# Patient Record
Sex: FEMALE | Race: WHITE | NOT HISPANIC OR LATINO | Employment: FULL TIME | URBAN - METROPOLITAN AREA
[De-identification: names, ages, dates, MRNs, and addresses within clinical notes are randomized per-mention and may not be internally consistent; named-entity substitution may affect disease eponyms.]

---

## 2017-12-08 ENCOUNTER — ALLSCRIPTS OFFICE VISIT (OUTPATIENT)
Dept: OTHER | Facility: OTHER | Age: 47
End: 2017-12-08

## 2017-12-08 DIAGNOSIS — R07.9 CHEST PAIN: ICD-10-CM

## 2017-12-08 DIAGNOSIS — R00.2 PALPITATIONS: ICD-10-CM

## 2017-12-12 NOTE — CONSULTS
Assessment  1  Palpitations (785 1) (R00 2)   2  Chest pain (786 50) (R07 9)    Plan  Chest pain    · (1) CBC/PLT/DIFF; Status:Active; Requested for:69Syv2421;    Perform:[facility]; Due:99Dlu8802; Ordered; For:Chest pain; Ordered By:Rodolfo Parmar;   · (1) COMPREHENSIVE METABOLIC PANEL; Status:Active; Requested for:07Xma2735;    Perform:[facility]; Due:58Vln6723; Ordered;pain; Ordered By:Rodolfo Parmar;   · (1) LIPID PANEL FASTING W DIRECT LDL REFLEX; Status:Active; Requestedfor:08Dec2017;    Perform:[facility]; Due:44Isu9128; Ordered; For:Chest pain; Ordered By:Rodolfo Parmar;   · (1) LIPOPROTEIN (a); Status:Active; Requested for:08Dec2017;    Perform:[facility]; Due:08Dec2018; Ordered; For:Chest pain; Ordered By:Rodolfo Parmar;   · (1) MAGNESIUM; Status:Active; Requested for:23Nkm1384;    Perform:[facility]; Due:12Ctq5748; Ordered; For:Chest pain; Ordered By:Rodolfo Parmar;   · (1) VITAMIN D 25-HYDROXY; Status:Active; Requested for:08Dec2017;    Perform:[facility]; Due:62Xzc3987; Ordered; For:Chest pain; Ordered By:Rodolfo Parmar;   · EKG/ECG- POC; Status:Complete;   Done: 82ENF1628   Perform: In Office; [phone]; Last Updated By:Aparna Decker; 12/8/2017 2:05:35 PM;Ordered; For:Chest pain; Ordered By:Rodolfo Parmar;   · STRESS TEST ONLY, EXERCISE; Status:Active; Requested for:08Dec2017;    Perform:[facility]; [phone]; Last Updated By:Frantz Elizabeth; 12/8/2017 3:08:19 PM;Ordered; For:Chest pain; Ordered By:Rodolfo Parmar;  Chest pain, Palpitations    · (1) TSH; Status:Active; Requested for:68Hgh7031;    Perform:[facility]; Due:98Ksn3832; Ordered;pain, Palpitations; Ordered By:Rodolfo Parmar;    Discussion/Summary    Palpitations- paroxysmal svt? previous 24 hour holter reported negative but patient states typical symptoms not [id number] holter monitor  pain- intermediate Larose risk factors  possible msk/anxiety related  discussed possible etiologyexercise treadmill        Chief Fay Song is here today for a second opinion  She brought recent echo with her  She states that she has chest pain, palpitations, and headaches  She hasn't felt like herself and the other Dr  she had seen diagnosed her with anxiety  She is concerned due to family history  JW      History of Present Illness  Cardiology HPI Free Text Note Form [facility]: Appreciate consultation 2nd opinionyo woman with no prior cardiac hx with periodic palpitations /chest tightness which is increasing in frequency  She has noticed sporadically not related with time  Has the symptoms more frequently and can happen daily  When she wore the heart monitor did not have the same symptoms  Last year felt a flutter but this year feels a tightness  She has been healthy and with no recent sickness  No weight change  No major life event- denies having significant syptoms  eye orbitmonitor and the echo cardiodrogramnot checked thyroid in a couple of years        [facility] Hospitalschool Counselorfor walk at [facility] and do eliptical- no change in performacesmoking hxglasses of wine a daycaffeine usage- occasional teaenergy drinksno daytime fatigue, sleepiness  Hxhx of valve replacementwith mitral valve prolapsewith triple bypass      Review of Systems     Cardiac: palpitations present , but-- No complaints of chest pain, no palpitations, no fainting  Skin: No complaints of nonhealing sores or skin rash  Genitourinary: No complaints of recurrent urinary tract infections, frequent urination at night, difficult urination, blood in urine, kidney stones, loss of bladder control, kidney problems, denies any birth control or hormone replacement, is not post menopausal, not currently pregnant  Psychological: No complaints of feeling depressed, anxiety, panic attacks, or difficulty concentrating  General: No complaints of trouble sleeping, lack of energy, fatigue, appetite changes, weight changes, fever, frequent infections, or night sweats    Respiratory: No complaints of shortness of breath, cough with sputum, or wheezing  HEENT: No complaints of serious problems, hearing problems, nose problems, throat problems, or snoring  Gastrointestinal: No complaints of liver problems, nausea, vomiting, heartburn, constipation, bloody stools, diarrhea, problems swallowing, adbominal pain, or rectal bleeding  Hematologic: No complaints of bleeding disorders, anemia, blood clots, or excessive brusing  Neurological: No complaints of numbness, tingling, dizziness, weakness, seizures, headaches, syncope or fainting, AM fatigue, daytime sleepiness, no witnessed apnea episodes  Musculoskeletal: No complaints of arthritis, back pain, or painfull swelling  ROS reviewed  Active Problems  1  Chest pain (786 50) (R07 9)   2  Left elbow tendinitis (727 09) (M77 8)   3  Tendinitis of left rotator cuff (726 10) (M75 82)   4  Ulcer (707 9)    Past Medical History   · History of hepatitis (V12 09) (Z86 19)   · History of hypertension (V12 59) (Z86 79)   · History of ulceration (V13 89) (X65 138)    The active problems and past medical history were reviewed and updated today  Surgical History   · History of Eye Surgery    The surgical history was reviewed and updated today  Family History  Maternal Grandmother    · Family history of Colon cancer (153 9) (C18 9)   · Family history of Lung cancer (162 9) (C34 90)  Family History    · Family history of arthritis (V17 7) (Z82 61)   · Family history of coronary artery disease (V17 3) (Z82 49)   · Family history of diabetes mellitus (V18 0) (Z83 3)   · Family history of hypertension (V17 49) (Z82 49)   · Family history of malignant neoplasm (V16 9) (Z80 9)   · Family history of High cholesterol  Family History Reviewed: The family history was reviewed and updated today         Social History     · Daily alcohol use   ·    · Never a smoker   · Occasional alcohol use   · Pets/Animals: Rabbit   · Sleeps 6 -7 hours a day  The social history was reviewed and updated today  Current Meds   1  B Complex Oral Tablet; TAKE 1 TABLET DAILY; Therapy: (Recorded:88Mtg5798) to Recorded   2  Echinacea 400 MG Oral Capsule; take 1 capsule daily; Therapy: (Recorded:74Pne7672) to Recorded   3  Flax Seed Oil 1000 MG Oral Capsule; TAKE AS DIRECTED; Therapy: (Recorded:75Znp6474) to Recorded   4  Iron 325 (65 Fe) MG Oral Tablet; TAKE 1 TABLET DAILY AS DIRECTED; Therapy: (Recorded:75Jvt3074) to Recorded   5  Vitamin D3 1000 UNIT Oral Tablet; TAKE 1 TABLET DAILY; Therapy: (Recorded:57Lhn0815) to Recorded   6  Vitamin E 400 UNIT Oral Tablet; TAKE 1 TABLET DAILY; Therapy: (Recorded:52Klo2381) to Recorded    The medication list was reviewed and updated today  Allergies  1  No Known Drug Allergies    Vitals  Signs     Heart Rate: 98, Apical  Systolic: 750, RUE, Sitting  Diastolic: 70, RUE, Sitting  Height: 5 ft 6 in  Weight: 158 lb 6 4 oz  BMI Calculated: 25 57  BSA Calculated: 1 81  O2 Saturation: 97, RA    Physical Exam   Constitutional  General appearance: No acute distress, well appearing and well nourished  Eyes  Conjunctiva and Sclera examination: Conjunctiva pink, sclera anicteric  Ears, Nose, Mouth, and Throat - Oropharynx: Clear, nares are clear, mucous membranes are moist   Neck  Neck and thyroid: Normal, supple, trachea midline, no thyromegaly  Pulmonary  Respiratory effort: No increased work of breathing or signs of respiratory distress  Auscultation of lungs: Clear to auscultation, no rales, no rhonchi, no wheezing, good air movement  Cardiovascular  Auscultation of heart: Normal rate and rhythm, normal S1 and S2, no murmurs  Carotid pulses: Normal, 2+ bilaterally  Peripheral vascular exam: Normal pulses throughout, no tenderness, erythema or swelling  Pedal pulses: Normal, 2+ bilaterally  Examination of extremities for edema and/or varicosities: Normal    Abdomen  Abdomen: Non-tender and no distention     Liver and spleen: No hepatomegaly or splenomegaly  Musculoskeletal Gait and station: Normal gait  -- Digits and nails: Normal without clubbing or cyanosis  -- Inspection/palpation of joints, bones, and muscles: Normal, ROM normal    Skin - Skin and subcutaneous tissue: Normal without rashes or lesions  Skin is warm and well perfused, normal turgor  Neurologic - Cranial nerves: II - XII intact  -- Speech: Normal    Psychiatric - Orientation to person, place, and time: Normal -- Mood and affect: Normal       Results/Data  I personally reviewed the recording/images in the office today  My interpretation follows  Echocardiogram/ZARA: Normal EF, mild LVH  normal sinus rhythm no acute st/t wave changes [id number]      Future Appointments    Date/Time Provider Specialty Site   01/15/2018 08:20 AM CYRUS Parekh  Cardiology [facility] CARDIOLOGY [facility]     End of Encounter Meds    1  B Complex Oral Tablet; TAKE 1 TABLET DAILY; Therapy: (Recorded:52Awj7197) to Recorded   2  Echinacea 400 MG Oral Capsule; take 1 capsule daily; Therapy: (Recorded:10Okt7066) to Recorded   3  Flax Seed Oil 1000 MG Oral Capsule; TAKE AS DIRECTED; Therapy: (Recorded:96Frl1631) to Recorded   4  Iron 325 (65 Fe) MG Oral Tablet; TAKE 1 TABLET DAILY AS DIRECTED; Therapy: (Recorded:34Lty6191) to Recorded   5  Vitamin D3 1000 UNIT Oral Tablet; TAKE 1 TABLET DAILY; Therapy: (Recorded:02Osh0327) to Recorded   6  Vitamin E 400 UNIT Oral Tablet; TAKE 1 TABLET DAILY;  Therapy: (Recorded:99Euw0638) to Recorded    Signatures   Electronically signed by : CYRUS Godinez ; Dec 12 2017 12:22AM EST                       (Author)

## 2017-12-19 ENCOUNTER — HOSPITAL ENCOUNTER (OUTPATIENT)
Dept: NON INVASIVE DIAGNOSTICS | Facility: HOSPITAL | Age: 47
Discharge: HOME | End: 2017-12-19
Attending: INTERNAL MEDICINE
Payer: COMMERCIAL

## 2017-12-19 ENCOUNTER — GENERIC CONVERSION - ENCOUNTER (OUTPATIENT)
Dept: CARDIOLOGY CLINIC | Facility: CLINIC | Age: 47
End: 2017-12-19

## 2017-12-19 ENCOUNTER — GENERIC CONVERSION - ENCOUNTER (OUTPATIENT)
Dept: OTHER | Facility: OTHER | Age: 47
End: 2017-12-19

## 2017-12-19 DIAGNOSIS — R00.2 PALPITATIONS: ICD-10-CM

## 2017-12-19 DIAGNOSIS — R07.9 CHEST PAIN: ICD-10-CM

## 2017-12-19 LAB
CHEST PAIN STATEMENT: NORMAL
MAX DIASTOLIC BP: 80 MMHG
MAX HEART RATE: 176 BPM
MAX PREDICTED HEART RATE: 172 BPM
MAX. SYSTOLIC BP: 160 MMHG
PROTOCOL NAME: NORMAL
REASON FOR TERMINATION: NORMAL
TARGET HR FORMULA: NORMAL
TEST INDICATION: NORMAL
TIME IN EXERCISE PHASE: NORMAL

## 2017-12-19 PROCEDURE — 93017 CV STRESS TEST TRACING ONLY: CPT

## 2017-12-19 PROCEDURE — 93225 XTRNL ECG REC<48 HRS REC: CPT

## 2017-12-19 PROCEDURE — 93226 XTRNL ECG REC<48 HR SCAN A/R: CPT

## 2017-12-19 RX ORDER — PNV NO.95/FERROUS FUM/FOLIC AC 28MG-0.8MG
TABLET ORAL
COMMUNITY
End: 2018-08-17

## 2017-12-19 RX ORDER — DIMENHYDRINATE 50 MG
TABLET ORAL
COMMUNITY
End: 2018-08-17

## 2017-12-19 RX ORDER — CALCIUM/MAGNESIUM/ZINC 333-133 MG
TABLET ORAL
COMMUNITY
End: 2018-08-17

## 2017-12-19 RX ORDER — MELATONIN
1000 DAILY
COMMUNITY
End: 2018-08-17

## 2017-12-20 LAB
25(OH)D3 SERPL-MCNC: 65.9 NG/ML
ALBUMIN SERPL BCP-MCNC: 4.7 G/DL
ALP SERPL-CCNC: 42 U/L
ALT SERPL W P-5'-P-CCNC: 23 U/L
ANION GAP SERPL CALCULATED.3IONS-SCNC: 2.2 MMOL/L
AST SERPL W P-5'-P-CCNC: 26 U/L
BASOPHILS # BLD AUTO: 0 10*3/UL
BASOPHILS # BLD AUTO: 0 10*3/UL
BILIRUB SERPL-MCNC: 0.7 MG/DL
BUN SERPL-MCNC: 18 MG/DL
CALCIUM SERPL-MCNC: 9.8 MG/DL
CHLORIDE SERPL-SCNC: 100 MMOL/L
CO2 SERPL-SCNC: 22 MMOL/L
CREAT SERPL-MCNC: 0.97 MG/DL
DEPRECATED RDW RBC AUTO: 13 FL
EGFR AFRICAN AMERICAN (HISTORICAL): 80
EGFR-AMERICAN CALC (HISTORICAL): 69
EOSINOPHIL # BLD AUTO: 0 10*3/UL
EOSINOPHIL NFR BLD AUTO: 1 %
GLUCOSE SERPL-MCNC: 110 MG/DL
HCT VFR BLD AUTO: 39.7 %
HGB BLD-MCNC: 13.7 G/DL
LYMPHOCYTES # BLD AUTO: 2.1 10*3/UL
LYMPHOCYTES NFR BLD AUTO: 27 %
MAGNESIUM SERPL-MCNC: 2.1 MG/DL
MCH RBC QN AUTO: 31.9 PG
MCHC RBC AUTO-ENTMCNC: 34.5 G/DL
MCV RBC AUTO: 92 FL
MONOCYTES # BLD AUTO: 0.4 10*3/UL
MONOCYTES NFR BLD AUTO: 5 %
NEUTROPHILS # BLD AUTO: 5.3 10*3/UL
NEUTS SEG NFR BLD AUTO: 67 %
PLATELET # BLD AUTO: 256 10*3/UL
POTASSIUM SERPL-SCNC: 4.2 MMOL/L
RBC # BLD AUTO: 4.3 10*6/UL
SODIUM SERPL-SCNC: 139 MMOL/L
TOTAL PROTEIN (HISTORICAL): 6.8
TSH SERPL DL<=0.05 MIU/L-ACNC: 2.3 M[IU]/L
WBC # BLD AUTO: 7.9 10*3/UL

## 2018-01-02 ENCOUNTER — GENERIC CONVERSION - ENCOUNTER (OUTPATIENT)
Dept: OTHER | Facility: OTHER | Age: 48
End: 2018-01-02

## 2018-01-15 ENCOUNTER — TRANSCRIBE ORDERS (OUTPATIENT)
Dept: ADMINISTRATIVE | Facility: HOSPITAL | Age: 48
End: 2018-01-15

## 2018-01-15 ENCOUNTER — GENERIC CONVERSION - ENCOUNTER (OUTPATIENT)
Dept: OTHER | Facility: OTHER | Age: 48
End: 2018-01-15

## 2018-01-15 ENCOUNTER — HOSPITAL ENCOUNTER (OUTPATIENT)
Dept: RADIOLOGY | Facility: HOSPITAL | Age: 48
Discharge: HOME | End: 2018-01-15
Payer: COMMERCIAL

## 2018-01-15 DIAGNOSIS — R07.89 OTHER CHEST PAIN: ICD-10-CM

## 2018-01-15 DIAGNOSIS — R07.89 OTHER CHEST PAIN: Primary | ICD-10-CM

## 2018-01-15 PROCEDURE — 71046 X-RAY EXAM CHEST 2 VIEWS: CPT

## 2018-01-15 PROCEDURE — 71100 X-RAY EXAM RIBS UNI 2 VIEWS: CPT

## 2018-01-23 VITALS
WEIGHT: 158.4 LBS | HEIGHT: 66 IN | HEART RATE: 98 BPM | OXYGEN SATURATION: 97 % | BODY MASS INDEX: 25.46 KG/M2 | DIASTOLIC BLOOD PRESSURE: 70 MMHG | SYSTOLIC BLOOD PRESSURE: 108 MMHG

## 2018-01-23 NOTE — MISCELLANEOUS
Message  Return to work or school:   Anastacia Ferguson is under my professional care   She was seen in my office on 12/8/2017             Signatures   Electronically signed by : CYRUS Rogers Asp ; Dec  8 2017  3:01PM EST                       (Author)

## 2018-01-24 VITALS
OXYGEN SATURATION: 98 % | SYSTOLIC BLOOD PRESSURE: 112 MMHG | BODY MASS INDEX: 25.55 KG/M2 | DIASTOLIC BLOOD PRESSURE: 72 MMHG | WEIGHT: 159 LBS | HEART RATE: 76 BPM | HEIGHT: 66 IN

## 2018-07-19 ENCOUNTER — TRANSCRIBE ORDERS (OUTPATIENT)
Dept: ADMINISTRATIVE | Facility: HOSPITAL | Age: 48
End: 2018-07-19

## 2018-07-19 DIAGNOSIS — G56.00 CARPAL TUNNEL SYNDROME, UNSPECIFIED LATERALITY: Primary | ICD-10-CM

## 2018-08-17 ENCOUNTER — CONSULT (OUTPATIENT)
Dept: PAIN MEDICINE | Facility: CLINIC | Age: 48
End: 2018-08-17
Payer: COMMERCIAL

## 2018-08-17 VITALS
HEART RATE: 74 BPM | WEIGHT: 149 LBS | SYSTOLIC BLOOD PRESSURE: 120 MMHG | TEMPERATURE: 98.6 F | BODY MASS INDEX: 23.95 KG/M2 | DIASTOLIC BLOOD PRESSURE: 76 MMHG | HEIGHT: 66 IN

## 2018-08-17 DIAGNOSIS — M50.120 CERVICAL DISC DISORDER WITH RADICULOPATHY OF MID-CERVICAL REGION: Primary | ICD-10-CM

## 2018-08-17 DIAGNOSIS — G62.9 POLYNEUROPATHY: ICD-10-CM

## 2018-08-17 DIAGNOSIS — M47.812 SPONDYLOSIS OF CERVICAL REGION WITHOUT MYELOPATHY OR RADICULOPATHY: ICD-10-CM

## 2018-08-17 PROCEDURE — 99244 OFF/OP CNSLTJ NEW/EST MOD 40: CPT | Performed by: ANESTHESIOLOGY

## 2018-08-17 NOTE — PROGRESS NOTES
Assessment:  1  Cervical disc disorder with radiculopathy of mid-cervical region    2  Spondylosis of cervical region without myelopathy or radiculopathy    3  Polyneuropathy        Plan:  The patient's symptoms, history/physical are consistent with pain that is multifactorial in origin but predominantly the result of her multilevel cervical spondylosis which is leading to her left neck, shoudler and radiating arm pain  At this time, I discussed performing a cervical epidural steroid injection to help reduce swelling inflammation which is leading to her pain symptoms  She was apprised of the most common risks and would like to proceed  She will be scheduled for an upcoming Tuesday or Thursday under fluoroscopic guidance  In regards to the neuropathy, I discuss possibly starting nortriptyline but she declined due to her sensitivity medications and wanting to avoid any side effects that may occur  My impressions and treatment recommendations were discussed in detail with the patient who verbalized understanding and had no further questions  Discharge instructions were provided  I personally saw and examined the patient and I agree with the above discussed plan of care  No orders of the defined types were placed in this encounter  New Medications Ordered This Visit   Medications    estradiol cypionate (DEPO-ESTRADIOL) 5 MG/ML injection     Sig: Inject 5 mg into a muscle once       History of Present Illness:    Isabelle Flores is a 51 y o  female who presents for consultation in regards to numbness of her hands, feet and neck pain  Symptoms have been present for 8 months without any precipitating injury or trauma  Pain is moderate to severe rated 8/10 on numeric rating scale and felt nearly constantly  Symptoms described to be burning, sharp, dull, aching, shooting with numbness and paresthesias in the hands and feet  Symptoms are aggravated lying down, standing, sitting, relaxation    There is no change with coughing, sneezing or bowel movements  Treatment history has included physical therapy and exercise on her own which has provided no relief  Heat/ice treatment provides no relief  She uses Tylenol and Aleve as needed which provides mild relief  EMG conducted by Dr Jackson Vega showed questionable sensory axonal neuropathy but no evidence of cervical or lumbar radiculopathy or carpal tunnel syndrome  I have personally reviewed and/or updated the patient's past medical history, past surgical history, family history, social history, current medications, allergies, and vital signs today  Review of Systems:    Review of Systems   Constitutional: Negative for fever and unexpected weight change  HENT: Negative for trouble swallowing  Eyes: Negative for visual disturbance  Respiratory: Negative for shortness of breath and wheezing  Cardiovascular: Negative for chest pain and palpitations  Gastrointestinal: Negative for constipation, diarrhea, nausea and vomiting  Endocrine: Negative for cold intolerance, heat intolerance and polydipsia  Genitourinary: Positive for frequency  Negative for difficulty urinating  Musculoskeletal: Positive for gait problem  Negative for arthralgias, joint swelling and myalgias  Skin: Negative for rash  Neurological: Negative for dizziness, seizures, syncope, weakness and headaches  Hematological: Bruises/bleeds easily  Psychiatric/Behavioral: Negative for dysphoric mood  All other systems reviewed and are negative  There is no problem list on file for this patient  No past medical history on file  No past surgical history on file  No family history on file  Social History     Occupational History    Not on file       Social History Main Topics    Smoking status: Not on file    Smokeless tobacco: Not on file    Alcohol use Not on file    Drug use: Unknown    Sexual activity: Not on file       Current Outpatient Prescriptions on File Prior to Visit   Medication Sig    [DISCONTINUED] b complex vitamins tablet Take 1 tablet by mouth daily    [DISCONTINUED] cholecalciferol (VITAMIN D3) 1,000 units tablet Take 1,000 Units by mouth daily    [DISCONTINUED] Echinacea 400 MG CAPS Take by mouth    [DISCONTINUED] Ferrous Sulfate (IRON) 325 (65 Fe) MG TABS Take by mouth    [DISCONTINUED] Flaxseed, Linseed, (FLAX SEED OIL) 1000 MG CAPS Take by mouth     No current facility-administered medications on file prior to visit  Allergies   Allergen Reactions    Gabapentin Drowsiness       Physical Exam:    /76   Pulse 74   Temp 98 6 °F (37 °C) (Oral)   Ht 5' 6" (1 676 m)   Wt 67 6 kg (149 lb)   BMI 24 05 kg/m²     Constitutional: normal, well developed, well nourished, alert, in no distress and non-toxic and no overt pain behavior    Eyes: anicteric  HEENT: grossly intact  Neck: supple, symmetric, trachea midline and no masses   Pulmonary:even and unlabored  Cardiovascular:No edema or pitting edema present  Skin:Normal without rashes or lesions and well hydrated  Psychiatric:Mood and affect appropriate  Neurologic:Cranial Nerves II-XII grossly intact  Musculoskeletal:normal     Cervical Spine Exam  Appearance:  Normal lordosis  Palpation/Tenderness:  left cervical paraspinal tenderness  right cervical paraspinal tenderness  left trapezium tenderness  right trapezium tenderness  Sensory:  no sensory deficits noted  Range of Motion:  Flexion:  Minimally limited  with pain  Extension:  Moderately limited  with pain  Lateral Flexion - Left:  Moderately limited  with pain  Lateral Flexion - Right:  No limitation  without pain  Rotation - Left:  Moderately limited  with pain  Rotation - Right:  No limitation  without pain  Motor Strength:  Left Arm Flexion  5/5  Left Arm Extension  5/5  Right Arm Flexion  5/5  Right Arm Extension  5/5  Left Wrist Flexion  5/5  Left Wrist Extension  5/5   Right Wrist Flexion 5/5  Right Wrist Extension 5/5  Reflexes:  Left Biceps:  2+   Right Biceps:  2+   Left Triceps:  2+   Right Triceps:  2+   Special Tests:  Left Spurlings:  negative  Right Spurlings  negative    Imaging    MRI C-spine (4/30/2018)    C3-4:  Small posterior disc osteophyte complex and mild facet arthrosis    C4-5:  Normal    C5-6:  Mild facet arthrosis with uncommon vertebral joint degenerative changes  Small posterior disc osteophyte complex  Effacement of the ventral CSF  C6-7:  Facet arthrosis with uncommon vertebral joint degenerative changes  Small posterior disc osteophyte complex with slight leftward prior dominant

## 2018-08-28 ENCOUNTER — TELEPHONE (OUTPATIENT)
Dept: PAIN MEDICINE | Facility: MEDICAL CENTER | Age: 48
End: 2018-08-28

## 2018-08-28 NOTE — TELEPHONE ENCOUNTER
Pt is scheduled for JERRY on 9/4  I informed pt she could have a massage but she should make them aware of her upcoming inj on 9/4 and if any of the massage causes pain to ask them to avoid that area

## 2018-08-28 NOTE — TELEPHONE ENCOUNTER
Pt called and left a voicemail today 8/28/18 @ 1162 am- please call patient to let her know if she can get a massage prior to her injection

## 2018-09-04 ENCOUNTER — HOSPITAL ENCOUNTER (OUTPATIENT)
Dept: RADIOLOGY | Facility: CLINIC | Age: 48
Discharge: HOME | End: 2018-09-04
Admitting: ANESTHESIOLOGY
Payer: COMMERCIAL

## 2018-09-04 VITALS
SYSTOLIC BLOOD PRESSURE: 135 MMHG | HEART RATE: 82 BPM | TEMPERATURE: 99 F | RESPIRATION RATE: 20 BRPM | DIASTOLIC BLOOD PRESSURE: 83 MMHG | OXYGEN SATURATION: 100 %

## 2018-09-04 DIAGNOSIS — M50.120 CERVICAL DISC DISORDER WITH RADICULOPATHY OF MID-CERVICAL REGION: ICD-10-CM

## 2018-09-04 PROCEDURE — 62321 NJX INTERLAMINAR CRV/THRC: CPT | Performed by: ANESTHESIOLOGY

## 2018-09-04 RX ORDER — LIDOCAINE HYDROCHLORIDE 10 MG/ML
5 INJECTION, SOLUTION EPIDURAL; INFILTRATION; INTRACAUDAL; PERINEURAL ONCE
Status: COMPLETED | OUTPATIENT
Start: 2018-09-04 | End: 2018-09-04

## 2018-09-04 RX ORDER — METHYLPREDNISOLONE ACETATE 80 MG/ML
80 INJECTION, SUSPENSION INTRA-ARTICULAR; INTRALESIONAL; INTRAMUSCULAR; PARENTERAL; SOFT TISSUE ONCE
Status: COMPLETED | OUTPATIENT
Start: 2018-09-04 | End: 2018-09-04

## 2018-09-04 RX ADMIN — IOHEXOL 1 ML: 300 INJECTION, SOLUTION INTRAVENOUS at 14:22

## 2018-09-04 RX ADMIN — METHYLPREDNISOLONE ACETATE 80 MG: 80 INJECTION, SUSPENSION INTRA-ARTICULAR; INTRALESIONAL; INTRAMUSCULAR; PARENTERAL; SOFT TISSUE at 14:22

## 2018-09-04 RX ADMIN — LIDOCAINE HYDROCHLORIDE 5 ML: 10 INJECTION, SOLUTION EPIDURAL; INFILTRATION; INTRACAUDAL; PERINEURAL at 14:18

## 2018-09-04 NOTE — DISCHARGE INSTR - LAB
Epidural Steroid Injection   WHAT YOU NEED TO KNOW:   An epidural steroid injection (RY) is a procedure to inject steroid medicine into the epidural space  The epidural space is between your spinal cord and vertebrae  Steroids reduce inflammation and fluid buildup in your spine that may be causing pain  You may be given pain medicine along with the steroids  ACTIVITY  · Do not drive or operate machinery today  · No strenuous activity today - bending, lifting, etc   · You may resume normal activites starting tomorrow - start slowly and as tolerated  · You may shower today, but no tub baths or hot tubs  · You may have numbness for several hours from the local anesthetic  Please use caution and common sense, especially with weight-bearing activities  CARE OF THE INJECTION SITE  · If you have soreness or pain, apply ice to the area today (20 minutes on/20 minutes off)  · Starting tomorrow, you may use warm, moist heat or ice if needed  · You may have an increase or change in your discomfort for 36-48 hours after your treatment  · Apply ice and continue with any pain medication you have been prescribed  · Notify [facility] if you have any of the following: redness, drainage, swelling, headache, stiff neck or fever above 100°F     SPECIAL INSTRUCTIONS  · Our office will contact you in approximately 7 days for a progress report  MEDICATIONS  · Continue to take all routine medications  · Our office may have instructed you to hold some medications  If you have a problem specifically related to your procedure, please call our office at [phone]  Problems not related to your procedure should be directed to your primary care physician

## 2018-09-04 NOTE — H&P
History of Present Illness: The patient is a 51 y o  female who presents with complaints of neck pain secondary to cervical bulging disc and is here today for cervical epidural steroid injection  There is no problem list on file for this patient  No past medical history on file  No past surgical history on file  Current Outpatient Prescriptions:     estradiol cypionate (DEPO-ESTRADIOL) 5 MG/ML injection, Inject 5 mg into a muscle once, Disp: , Rfl:     Current Facility-Administered Medications:     iohexol (OMNIPAQUE) 300 mg/mL injection 50 mL, 50 mL, Epidural, Once, Elvia Webb MD    lidocaine (PF) (XYLOCAINE-MPF) 1 % injection 5 mL, 5 mL, Infiltration, Once, Elvia Webb MD    methylPREDNISolone acetate (DEPO-MEDROL) injection 80 mg, 80 mg, Epidural, Once, Elvia Webb MD    Allergies   Allergen Reactions    Gabapentin Drowsiness       Physical Exam:   Vitals:    09/04/18 1404   BP: 134/87   Pulse: 80   Resp: 20   Temp: 99 °F (37 2 °C)   SpO2: 100%     General: Awake, Alert, Oriented x 3, Mood and affect appropriate  Respiratory: Respirations even and unlabored  Cardiovascular: Peripheral pulses intact; no edema  Musculoskeletal Exam:   Neck tenderness    ASA Score: 2    Patient/Chart Verification  Patient ID Verified: Verbal  Consents Confirmed: Procedural, To be obtained in the Pre-Procedure area  H&P( within 30 days) Verified: To be obtained in the Pre-Procedure area  Allergies Reviewed: Yes  Anticoag/NSAID held?: NA  Currently on antibiotics?: No  Pregnancy denied?: Yes    Assessment:   1   Cervical disc disorder with radiculopathy of mid-cervical region        Plan: JERRY

## 2018-09-06 ENCOUNTER — TELEPHONE (OUTPATIENT)
Dept: PAIN MEDICINE | Facility: MEDICAL CENTER | Age: 48
End: 2018-09-06

## 2018-09-06 DIAGNOSIS — M79.10 MUSCLE PAIN: Primary | ICD-10-CM

## 2018-09-06 RX ORDER — CYCLOBENZAPRINE HCL 5 MG
TABLET ORAL
Qty: 30 TABLET | Refills: 0 | Status: SHIPPED | OUTPATIENT
Start: 2018-09-06

## 2018-09-06 NOTE — TELEPHONE ENCOUNTER
It probably a muscle spasm related to how she was positioned on the table  Can prescribe her flexeril if she would like, otherwise should go away on it's own  Glad her neck is feeling better

## 2018-09-06 NOTE — TELEPHONE ENCOUNTER
Pt informed of the same as stated in FQ's previous task  Pt was hesitant at first to try the flexeril b/c it may make her drowsy and she is at school  I suggested she could try taking 0 5 mg tab at [facility] the nights before she goes to work and try whole tab at [facility] when she doesn't work the next day  Pt willing to try the flexeril, uses pharmacy on file  Pt will call pharmacy before going to p/u to be sure Rx is ready

## 2018-09-06 NOTE — TELEPHONE ENCOUNTER
Pt reports she's been having pain since yest in the middle of her LB, it's intermittent and it hurts more if she sits too long  She took aleve last night and acetaminophen today along with using ice on it  Pt didn't know if it's related to her JERRY she had on Tues 9/4 b/c she hadn't had this LB pain before that inj  Pt said her needle site from her JERRY looked fine her son checked it last night  She denies drainage, fevers, headaches, or stiff neck  Pt can not account for any recent injury or incident to her LB  Pt said her neck is feeling better since the inj  Told pt I did not think LB pain related to her JERRY but I will forward info to Dr Venessa Vasquez to review

## 2018-09-06 NOTE — TELEPHONE ENCOUNTER
Problem: KNOWLEDGE DEFICIT  Goal: Patient/S.O. demonstrates understanding of disease process, treatment plan, medications, and discharge instructions.   Outcome: Met This Shift Pt is calling stating that she had an epidural injection in her neck on 9/4 and now she is experiencing pain in her lower back now  Pt states pain level is about a 9 for her back now and her neck is feeling okay, so she is a bit concerned   Pt can be reached at work at [phone]

## 2018-09-14 ENCOUNTER — TELEPHONE (OUTPATIENT)
Dept: RADIOLOGY | Facility: CLINIC | Age: 48
End: 2018-09-14

## 2018-09-24 ENCOUNTER — HOSPITAL ENCOUNTER (OUTPATIENT)
Dept: RADIOLOGY | Facility: CLINIC | Age: 48
Discharge: HOME | End: 2018-09-24
Payer: COMMERCIAL

## 2018-09-24 ENCOUNTER — TRANSCRIBE ORDERS (OUTPATIENT)
Dept: ADMINISTRATIVE | Facility: HOSPITAL | Age: 48
End: 2018-09-24

## 2018-09-24 DIAGNOSIS — G56.00 CARPAL TUNNEL SYNDROME, UNSPECIFIED LATERALITY: ICD-10-CM

## 2018-09-24 DIAGNOSIS — M54.32 LEFT SIDED SCIATICA: Primary | ICD-10-CM

## 2018-09-24 PROCEDURE — 95886 MUSC TEST DONE W/N TEST COMP: CPT | Performed by: PHYSICAL MEDICINE & REHABILITATION

## 2018-09-24 PROCEDURE — 95910 NRV CNDJ TEST 7-8 STUDIES: CPT | Performed by: PHYSICAL MEDICINE & REHABILITATION

## 2018-09-24 NOTE — PROCEDURES
Procedures      Electromyogram and Nerve Conduction Velocity Procedure Note    HX:  39-year-old left hand dominant female referred by primary care for electrodiagnostic study of both upper extremities  She has had longstanding symptoms of neck pain with numbness and tingling in both upper extremities  She reports a history of degenerative disc disease and had a recent epidural injection marked improvement of her symptoms  PMH:   She denies any ongoing medical problems  Exam:     On exam there is no reflex, motor, or sensory changes  Provocative maneuvers are unremarkable  Procedure:  Verbal informed consent was obtained as with all electrodiagnostic medicine patients  As with all patients this patient was informed that they may terminate the  examine at any time  Patient tolerated the procedure well with no adverse effects reported or observed  Findings:  Please see the [employer] [facility] data printout  Both right and left median motor fibers demonstrated mild partial conduction block with stimulation across the carpal ligament segment with normal latencies amplitude responses and proximal conductions  Both right and left median sensory fibers also demonstrated mild partial conduction block without evidence of slowing  The right ulnar motor fibers demonstrate significant slowing across the elbow segment  Studies of the left ulnar nerve were entirely normal     Electromyography: Monopolar needle exploration failed to reveal any abnormal spontaneous potentials the following muscles bilaterally:  Deltoids, triceps, brachioradialis, 1st dorsal interosseous, abductor pollicis brevis  In these muscles motor units were normal for amplitude, duration, and configuration as well as all recruitment patterns were normal       In the right cervical paraspinals or complex repetitive discharges noted the left-sided cervical paraspinal exam was normal  Conclusion:    Moderately abnormal study:    1  There is evidence for mild partial conduction block of the motor and sensory fibers of both median nerves at the wrist these findings supports clinical diagnosis of a bilateral "carpal tunnel syndrome" which is  Mild and acute  2   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

## 2018-09-26 ENCOUNTER — HOSPITAL ENCOUNTER (OUTPATIENT)
Dept: RADIOLOGY | Facility: CLINIC | Age: 48
Discharge: HOME | End: 2018-09-26
Attending: PHYSICAL MEDICINE & REHABILITATION
Payer: COMMERCIAL

## 2018-09-26 DIAGNOSIS — M54.32 LEFT SIDED SCIATICA: ICD-10-CM

## 2018-09-26 PROCEDURE — 95886 MUSC TEST DONE W/N TEST COMP: CPT | Performed by: PHYSICAL MEDICINE & REHABILITATION

## 2018-09-26 PROCEDURE — 95909 NRV CNDJ TST 5-6 STUDIES: CPT | Performed by: PHYSICAL MEDICINE & REHABILITATION

## 2018-09-26 NOTE — PROCEDURES
Procedures      Electromyogram and Nerve Conduction Velocity Procedure Note    HX:  This is a 76-year-old female referred by Dr Shania Rosen for electrodiagnostic study regarding left sided "sciatica"  She reports vague pain radiating from the left buttock down to the level of the knee sometimes in the medial aspect of the thigh but not below  She does have some vague intermittent numbness in both feet  She does have back pain but not currently today  PMH:   Denies    Exam:    reflex, motor, and sensory examination are entirely normal in the lower extremities  She has no long tract signs no root tension signs she does have some mild atrophy of the extensor digitorum brevis muscles bilaterally  Procedure:  Verbal informed consent was obtained as with all electrodiagnostic medicine patients  As with all patients this patient was informed that they may terminate the  examine at any time  Patient tolerated the procedure well with no adverse effects reported or observed  Findings:  Please see the [facility] data printout  The bilateral sural sensory responses were very low amplitude for age  The bilateral tibial H reflexes are absent  Both peroneal motor distal latencies were normal with borderline normal amplitude responses and preserved conduction through the  Leg with no change in the waveform stimulating across the fibular head  Electromyography:  Conclusion:     Abnormal study:     1  The above electrophysiologic data is suggestive 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

## 2018-10-18 ENCOUNTER — TRANSCRIBE ORDERS (OUTPATIENT)
Dept: ADMINISTRATIVE | Facility: HOSPITAL | Age: 48
End: 2018-10-18

## 2018-10-18 DIAGNOSIS — G43.019 COMMON MIGRAINE WITH INTRACTABLE MIGRAINE: ICD-10-CM

## 2018-10-18 DIAGNOSIS — H57.11 PAIN IN RIGHT EYE: Primary | ICD-10-CM

## 2018-10-25 ENCOUNTER — HOSPITAL ENCOUNTER (OUTPATIENT)
Dept: RADIOLOGY | Age: 48
Discharge: HOME | End: 2018-10-25
Payer: COMMERCIAL

## 2018-10-25 DIAGNOSIS — G43.019 COMMON MIGRAINE WITH INTRACTABLE MIGRAINE: ICD-10-CM

## 2018-10-25 DIAGNOSIS — H57.11 PAIN IN RIGHT EYE: ICD-10-CM

## 2018-10-25 PROCEDURE — 70543 MRI ORBT/FAC/NCK W/O &W/DYE: CPT

## 2018-10-25 PROCEDURE — A9585 GADOBUTROL INJECTION: HCPCS | Performed by: OPHTHALMOLOGY

## 2018-10-25 RX ADMIN — GADOBUTROL 7 ML: 604.72 INJECTION INTRAVENOUS at 19:15

## 2019-01-12 NOTE — TELEPHONE ENCOUNTER
E-rx sent for cyclobenzaprine 5mg 0 5 tab QAM and full tab QHS to her pharmacy # [id number] Statement Selected

## 2019-03-11 ENCOUNTER — ANNUAL EXAM (OUTPATIENT)
Dept: OBGYN CLINIC | Facility: CLINIC | Age: 49
End: 2019-03-11
Payer: COMMERCIAL

## 2019-03-11 VITALS
SYSTOLIC BLOOD PRESSURE: 116 MMHG | BODY MASS INDEX: 27.49 KG/M2 | HEIGHT: 64 IN | DIASTOLIC BLOOD PRESSURE: 76 MMHG | WEIGHT: 161 LBS

## 2019-03-11 DIAGNOSIS — Z01.419 CERVICAL SMEAR, AS PART OF ROUTINE GYNECOLOGICAL EXAMINATION: ICD-10-CM

## 2019-03-11 DIAGNOSIS — Z30.42 SURVEILLANCE OF CONTRACEPTIVE INJECTION: ICD-10-CM

## 2019-03-11 DIAGNOSIS — Z01.419 ENCOUNTER FOR WELL WOMAN EXAM: Primary | ICD-10-CM

## 2019-03-11 DIAGNOSIS — Z11.51 SPECIAL SCREENING EXAMINATION FOR HUMAN PAPILLOMAVIRUS (HPV): ICD-10-CM

## 2019-03-11 DIAGNOSIS — Z12.39 SCREENING BREAST EXAMINATION: ICD-10-CM

## 2019-03-11 PROCEDURE — 99386 PREV VISIT NEW AGE 40-64: CPT | Performed by: PHYSICIAN ASSISTANT

## 2019-03-11 RX ORDER — MEDROXYPROGESTERONE ACETATE 150 MG/ML
150 INJECTION, SUSPENSION INTRAMUSCULAR
COMMUNITY

## 2019-03-11 RX ORDER — MELATONIN
1000 DAILY
COMMUNITY

## 2019-03-11 RX ORDER — THIAMINE HCL 50 MG
50 TABLET ORAL DAILY
COMMUNITY

## 2019-03-11 RX ORDER — RIBOFLAVIN (VITAMIN B2) 100 MG
100 TABLET ORAL DAILY
COMMUNITY

## 2019-03-11 NOTE — PROGRESS NOTES
Pt is here for yearly exam  Pt is doing well on depo injection  Pt felt small lump on right breast  B&B ok     2/17/17 Normal Pap    1/11/16 Normal Pap  Negative HPV  12/26/14 Normal Pap    10/18/18 Normal Mammo

## 2019-03-12 NOTE — PROGRESS NOTES
Assessment/Plan:    No problem-specific Assessment & Plan notes found for this encounter  Diagnoses and all orders for this visit:    Encounter for well woman exam    Screening breast examination  -     Mammo screening bilateral w cad; Future    Cervical smear, as part of routine gynecological examination  -     GP Pap Dependent HPV Cotest >= 27years old    Special screening examination for human papillomavirus (HPV)  -     GP Pap Dependent HPV Cotest >= 27years old    Surveillance of contraceptive injection    Other orders  -     medroxyPROGESTERone (DEPO-PROVERA) 150 mg/mL injection; Inject 150 mg into a muscle every 3 (three) months  -     thiamine (VITAMIN B1) 50 mg tablet; Take 50 mg by mouth daily  -     cholecalciferol (VITAMIN D3) 1,000 units tablet; Take 1,000 Units by mouth daily  -     Ascorbic Acid (VITAMIN C) 100 MG tablet; Take 100 mg by mouth daily  -     Flaxseed, Linseed, (FLAX SEEDS PO); Take by mouth  -     amoxicillin-clavulanate (AUGMENTIN) 125-31 25 mg/5 mL oral suspension; Take by mouth 2 (two) times a day          Subjective:      Patient ID: Lee Willett is a 51 y o  female  Pt presents today for her annual exam   She has no bleeding on depo and loves it  Would like to continue for one more year  No pelvic pain  Bowel and bladder are regular  No breast concerns  Pt still very active  Trying to move to [place]  The following portions of the patient's history were reviewed and updated as appropriate: allergies, current medications, past family history, past medical history, past social history, past surgical history and problem list     Review of Systems   Constitutional: Negative for chills, fever and unexpected weight change  Gastrointestinal: Negative for abdominal pain, blood in stool, constipation and diarrhea  Genitourinary: Negative  Objective:      /76 (BP Location: Right arm, Patient Position: Sitting, Cuff Size: Standard)   Ht 5' 4" (1 626 m)   Wt 73 kg (161 lb)   Breastfeeding? No   BMI 27 64 kg/m²          Physical Exam   Constitutional: She appears well-developed and well-nourished  HENT:   Head: Normocephalic and atraumatic  Neck: Normal range of motion  Pulmonary/Chest: Right breast exhibits no inverted nipple, no mass, no nipple discharge and no skin change  Left breast exhibits no inverted nipple, no mass, no nipple discharge and no skin change  Abdominal: Soft  Genitourinary: Vagina normal and uterus normal  Pelvic exam was performed with patient supine  There is no rash, tenderness or lesion on the right labia  There is no rash, tenderness or lesion on the left labia  Cervix exhibits no motion tenderness, no discharge and no friability  Right adnexum displays no mass, no tenderness and no fullness  Left adnexum displays no mass, no tenderness and no fullness  Lymphadenopathy: No inguinal adenopathy noted on the right or left side  Nursing note and vitals reviewed

## 2019-03-14 LAB
HPV HR 12 DNA CVX QL NAA+PROBE: NOT DETECTED
HPV16 DNA SPEC QL NAA+PROBE: NOT DETECTED
HPV18 DNA SPEC QL NAA+PROBE: NOT DETECTED
THIN PREP CVX: NORMAL

## 2019-03-18 ENCOUNTER — OFFICE VISIT (OUTPATIENT)
Dept: OBGYN CLINIC | Facility: CLINIC | Age: 49
End: 2019-03-18
Payer: COMMERCIAL

## 2019-03-18 VITALS
DIASTOLIC BLOOD PRESSURE: 82 MMHG | SYSTOLIC BLOOD PRESSURE: 120 MMHG | HEIGHT: 65 IN | BODY MASS INDEX: 27.24 KG/M2 | WEIGHT: 163.5 LBS

## 2019-03-18 DIAGNOSIS — Z30.42 ENCOUNTER FOR SURVEILLANCE OF INJECTABLE CONTRACEPTIVE: Primary | ICD-10-CM

## 2019-03-18 PROCEDURE — 96372 THER/PROPH/DIAG INJ SC/IM: CPT

## 2019-03-18 RX ORDER — MEDROXYPROGESTERONE ACETATE 150 MG/ML
150 INJECTION, SUSPENSION INTRAMUSCULAR ONCE
Status: COMPLETED | OUTPATIENT
Start: 2019-03-18 | End: 2019-03-18

## 2019-03-18 RX ADMIN — MEDROXYPROGESTERONE ACETATE 150 MG: 150 INJECTION, SUSPENSION INTRAMUSCULAR at 16:19

## 2019-03-18 NOTE — PROGRESS NOTES
Pt is here for Depo injection in LEFT Delt  Pt tolerated well      Lot#: [id number]  EXP:     Jessika 47: [id number]

## 2019-06-14 ENCOUNTER — OFFICE VISIT (OUTPATIENT)
Dept: OBGYN CLINIC | Facility: CLINIC | Age: 49
End: 2019-06-14
Payer: COMMERCIAL

## 2019-06-14 VITALS
BODY MASS INDEX: 24.91 KG/M2 | DIASTOLIC BLOOD PRESSURE: 80 MMHG | HEIGHT: 66 IN | SYSTOLIC BLOOD PRESSURE: 120 MMHG | WEIGHT: 155 LBS

## 2019-06-14 DIAGNOSIS — Z30.42 ENCOUNTER FOR SURVEILLANCE OF INJECTABLE CONTRACEPTIVE: Primary | ICD-10-CM

## 2019-06-14 PROCEDURE — 96372 THER/PROPH/DIAG INJ SC/IM: CPT

## 2019-06-14 RX ORDER — MEDROXYPROGESTERONE ACETATE 150 MG/ML
150 INJECTION, SUSPENSION INTRAMUSCULAR ONCE
Status: COMPLETED | OUTPATIENT
Start: 2019-06-14 | End: 2019-06-14

## 2019-06-14 RX ADMIN — MEDROXYPROGESTERONE ACETATE 150 MG: 150 INJECTION, SUSPENSION INTRAMUSCULAR at 09:59

## 2020-06-26 ENCOUNTER — TELEPHONE (OUTPATIENT)
Dept: OBGYN CLINIC | Facility: CLINIC | Age: 50
End: 2020-06-26

## 2022-07-30 ENCOUNTER — TELEPHONE ENCOUNTER (OUTPATIENT)
Age: 52
End: 2022-07-30

## 2022-07-31 ENCOUNTER — TELEPHONE ENCOUNTER (OUTPATIENT)
Age: 52
End: 2022-07-31